# Patient Record
Sex: FEMALE | URBAN - METROPOLITAN AREA
[De-identification: names, ages, dates, MRNs, and addresses within clinical notes are randomized per-mention and may not be internally consistent; named-entity substitution may affect disease eponyms.]

---

## 2021-11-12 ENCOUNTER — APPOINTMENT (RX ONLY)
Dept: URBAN - METROPOLITAN AREA CLINIC 51 | Facility: CLINIC | Age: 41
Setting detail: DERMATOLOGY
End: 2021-11-12

## 2021-11-12 DIAGNOSIS — Z41.9 ENCOUNTER FOR PROCEDURE FOR PURPOSES OTHER THAN REMEDYING HEALTH STATE, UNSPECIFIED: ICD-10-CM

## 2021-11-12 PROCEDURE — ? LASER HAIR REMOVAL

## 2021-11-12 NOTE — PROCEDURE: LASER HAIR REMOVAL
Treatment Number: 0
Post-Care Instructions: I reviewed with the patient in detail post-care instructions. Patient should avoid sun for a minimum of 4 weeks before and after treatment.
Laser Type: Lumenis Splendor X
Fluence (Will Not Render If 0): [address]
Render Post-Care In The Note: No
Spot Size: 12 mm
Total Pulses: [address]
Cooling: chill tip
Fluence (Will Not Render If 0): 15
Fluence (Will Not Render If 0): [address] [facility]
Topical Anesthesia Type: 9.6% lidocaine
Anesthesia Type: 1% lidocaine with epinephrine
Detail Level: Detailed
Pre-Procedure: Prior to proceeding the treatment areas were cleaned and all present put on their eye protection.
Spot Size: 10 mm
Treatment Number: 1
Post-Procedure Care: Immediate endpoint: perifollicular erythema and edema. Hydrocortisone cream applied. Post care reviewed with patient.
Consent: Written consent obtained, risks reviewed including but not limited to crusting, scabbing, blistering, scarring, darker or lighter pigmentary change, paradoxical hair regrowth, incomplete removal of hair and infection.
Number Of Prepaid Treatments (Will Not Render If 0): 8
Fluence (Will Not Render If 0): [address] [facility]
Total Pulses: [address]
Spot Size: 8 mm
Eye Shield Text: Given the treatment area eye shields were inserted prior to treatment.
Cooling: DCD setting

## 2021-12-10 ENCOUNTER — APPOINTMENT (RX ONLY)
Dept: URBAN - METROPOLITAN AREA CLINIC 51 | Facility: CLINIC | Age: 41
Setting detail: DERMATOLOGY
End: 2021-12-10

## 2021-12-10 DIAGNOSIS — Z41.9 ENCOUNTER FOR PROCEDURE FOR PURPOSES OTHER THAN REMEDYING HEALTH STATE, UNSPECIFIED: ICD-10-CM

## 2021-12-10 PROCEDURE — ? LASER HAIR REMOVAL

## 2021-12-10 NOTE — PROCEDURE: LASER HAIR REMOVAL
Spot Size: 8 mm
Number Of Prepaid Treatments (Will Not Render If 0): 0
Total Pulses: [address]
Cooling: DCD setting
Total Pulses: [address]
Cooling: chill tip
Spot Size: 12 mm
Render Post-Care In The Note: No
Treatment Number: 2
Pre-Procedure: Prior to proceeding the treatment areas were cleaned and all present put on their eye protection.
Anesthesia Type: 1% lidocaine with epinephrine
Post-Care Instructions: I reviewed with the patient in detail post-care instructions. Patient should avoid sun for a minimum of 4 weeks before and after treatment.
Fluence (Will Not Render If 0): [address]
Eye Shield Text: Given the treatment area eye shields were inserted prior to treatment.
Fluence (Will Not Render If 0): [address] [facility]
Post-Procedure Care: Immediate endpoint: perifollicular erythema and edema. Hydrocortisone cream applied. Post care reviewed with patient.
Topical Anesthesia Type: 9.6% lidocaine
Number Of Prepaid Treatments (Will Not Render If 0): 8
Fluence (Will Not Render If 0): 15
Detail Level: Detailed
Fluence (Will Not Render If 0): [address] [facility]
Consent: Written consent obtained, risks reviewed including but not limited to crusting, scabbing, blistering, scarring, darker or lighter pigmentary change, paradoxical hair regrowth, incomplete removal of hair and infection.
Laser Type: Lumenis Splendor X
Spot Size: 10 mm

## 2021-12-10 NOTE — HPI: COSMETIC (LASER HAIR REMOVAL)
Have You Had Laser Hair Removal Before?: has had previous treatment
When Was Your Last Laser Treatment?: 11/12/2021
Number Of Treatments: 1

## 2022-01-11 ENCOUNTER — APPOINTMENT (RX ONLY)
Dept: URBAN - METROPOLITAN AREA CLINIC 51 | Facility: CLINIC | Age: 42
Setting detail: DERMATOLOGY
End: 2022-01-11

## 2022-01-11 DIAGNOSIS — Z41.9 ENCOUNTER FOR PROCEDURE FOR PURPOSES OTHER THAN REMEDYING HEALTH STATE, UNSPECIFIED: ICD-10-CM

## 2022-01-11 PROCEDURE — ? LASER HAIR REMOVAL

## 2022-01-11 NOTE — PROCEDURE: LASER HAIR REMOVAL
Spot Size: 10 mm
Treatment Number: 0
Number Of Prepaid Treatments (Will Not Render If 0): 8
Post-Procedure Care: Immediate endpoint: perifollicular erythema and edema. Hydrocortisone cream applied. Post care reviewed with patient.
Detail Level: Detailed
Fluence (Will Not Render If 0): [address] [facility]
Consent: Written consent obtained, risks reviewed including but not limited to crusting, scabbing, blistering, scarring, darker or lighter pigmentary change, paradoxical hair regrowth, incomplete removal of hair and infection.
Cooling: DCD setting
Topical Anesthesia Type: 9.6% lidocaine
Eye Shield Text: Given the treatment area eye shields were inserted prior to treatment.
Total Pulses: [address]
Spot Size: 8 mm
Fluence (Will Not Render If 0): [address] [place]
Laser Type: Lumenis Splendor X
Post-Care Instructions: I reviewed with the patient in detail post-care instructions. Patient should avoid sun for a minimum of 4 weeks before and after treatment.
Cooling: chill tip
Total Pulses: [address]
Render Post-Care In The Note: No
Spot Size: 12 mm
Fluence (Will Not Render If 0): 15
Anesthesia Type: 1% lidocaine with epinephrine
Treatment Number: 3
Fluence (Will Not Render If 0): [address] [facility]
Pre-Procedure: Prior to proceeding the treatment areas were cleaned and all present put on their eye protection.
Fluence (Will Not Render If 0): 26
Total Pulses: [place]
Fluence (Will Not Render If 0): 20
Total Pulses (Settings #3): 548
Treatment Number: 1

## 2022-01-11 NOTE — HPI: COSMETIC (LASER HAIR REMOVAL)
Have You Had Laser Hair Removal Before?: has had previous treatment
When Was Your Last Laser Treatment?: 12/10/2021
Number Of Treatments: 2
Have You Had Laser Hair Removal Before?: has not had previous treatment

## 2022-02-08 ENCOUNTER — APPOINTMENT (RX ONLY)
Dept: URBAN - METROPOLITAN AREA CLINIC 51 | Facility: CLINIC | Age: 42
Setting detail: DERMATOLOGY
End: 2022-02-08

## 2022-02-08 DIAGNOSIS — Z41.9 ENCOUNTER FOR PROCEDURE FOR PURPOSES OTHER THAN REMEDYING HEALTH STATE, UNSPECIFIED: ICD-10-CM

## 2022-02-08 PROCEDURE — ? LASER HAIR REMOVAL

## 2022-02-08 NOTE — HPI: COSMETIC (LASER HAIR REMOVAL)
Have You Had Laser Hair Removal Before?: has had previous treatment
When Was Your Last Laser Treatment?: 01/11/2022
Number Of Treatments: 3
Number Of Treatments: 1

## 2022-02-08 NOTE — PROCEDURE: LASER HAIR REMOVAL
Treatment Number: 0
Number Of Prepaid Treatments (Will Not Render If 0): 8
Cooling: DCD setting
Consent: Written consent obtained, risks reviewed including but not limited to crusting, scabbing, blistering, scarring, darker or lighter pigmentary change, paradoxical hair regrowth, incomplete removal of hair and infection.
Post-Procedure Care: Immediate endpoint: perifollicular erythema and edema. Hydrocortisone cream applied. Post care reviewed with patient.
Fluence (Will Not Render If 0): [address] [facility]
Spot Size: 8 mm
Were Eye Shields Employed?: No
Total Pulses: [address]
Pre-Procedure: Prior to proceeding the treatment areas were cleaned and all present put on their eye protection.
Fluence (Will Not Render If 0): [address] [facility]
Treatment Number: 4
Cooling: chill tip
Anesthesia Type: 1% lidocaine with epinephrine
Spot Size: 10 mm
Spot Size: 12 mm
Total Pulses: [address] [facility]
Topical Anesthesia Type: 9.6% lidocaine
Fluence (Will Not Render If 0): 15
Eye Shield Text: Given the treatment area eye shields were inserted prior to treatment.
Laser Type: Lumenis Splendor X
Post-Care Instructions: I reviewed with the patient in detail post-care instructions. Patient should avoid sun for a minimum of 4 weeks before and after treatment.
Fluence (Will Not Render If 0): [address] [place] [address]
Detail Level: Detailed
Fluence (Will Not Render If 0): 20
[facility]
Total Pulses (Settings #3): 548
Treatment Number: 2
Fluence (Will Not Render If 0): 26

## 2022-03-11 ENCOUNTER — APPOINTMENT (RX ONLY)
Dept: URBAN - METROPOLITAN AREA CLINIC 51 | Facility: CLINIC | Age: 42
Setting detail: DERMATOLOGY
End: 2022-03-11

## 2022-03-11 DIAGNOSIS — Z41.9 ENCOUNTER FOR PROCEDURE FOR PURPOSES OTHER THAN REMEDYING HEALTH STATE, UNSPECIFIED: ICD-10-CM

## 2022-03-11 PROCEDURE — ? LASER HAIR REMOVAL

## 2022-03-11 NOTE — HPI: COSMETIC (LASER HAIR REMOVAL)
Have You Had Laser Hair Removal Before?: has had previous treatment
When Was Your Last Laser Treatment?: 02/08/2022
Number Of Treatments: 4
Number Of Treatments: 2

## 2022-03-11 NOTE — PROCEDURE: LASER HAIR REMOVAL
Treatment Number: 0
Fluence (Will Not Render If 0): [address]
Post-Care Instructions: I reviewed with the patient in detail post-care instructions. Patient should avoid sun for a minimum of 4 weeks before and after treatment.
Laser Type: Lumenis Splendor X
Render Post-Care In The Note: No
Spot Size: 12 mm
Eye Shield Text: Given the treatment area eye shields were inserted prior to treatment.
Topical Anesthesia Type: 9.6% lidocaine
Fluence (Will Not Render If 0): 15
Cooling: chill tip
Total Pulses: [address] [facility]
Treatment Number: 5
Spot Size: 10 mm
Fluence (Will Not Render If 0): [address] [facility]
Anesthesia Type: 1% lidocaine with epinephrine
Pre-Procedure: Prior to proceeding the treatment areas were cleaned and all present put on their eye protection.
Cooling: DCD setting
Consent: Written consent obtained, risks reviewed including but not limited to crusting, scabbing, blistering, scarring, darker or lighter pigmentary change, paradoxical hair regrowth, incomplete removal of hair and infection.
Post-Procedure Care: Immediate endpoint: perifollicular erythema and edema. Hydrocortisone cream applied. Post care reviewed with patient.
Detail Level: Detailed
Spot Size: 8 mm
Total Pulses: [address]
Fluence (Will Not Render If 0): [address] [facility]
Number Of Prepaid Treatments (Will Not Render If 0): 8
Total Pulses: [address] [facility]
Fluence (Will Not Render If 0): 20
Fluence (Will Not Render If 0): 26
Total Pulses (Settings #3): 548
Treatment Number: 3

## 2022-04-18 ENCOUNTER — APPOINTMENT (RX ONLY)
Dept: URBAN - METROPOLITAN AREA CLINIC 51 | Facility: CLINIC | Age: 42
Setting detail: DERMATOLOGY
End: 2022-04-18

## 2022-04-18 DIAGNOSIS — Z41.9 ENCOUNTER FOR PROCEDURE FOR PURPOSES OTHER THAN REMEDYING HEALTH STATE, UNSPECIFIED: ICD-10-CM

## 2022-04-18 PROCEDURE — ? LASER HAIR REMOVAL

## 2022-04-18 NOTE — PROCEDURE: LASER HAIR REMOVAL
After obtaining consent, and per orders of LEVI High, injection of Vit B12 given in Right deltoid by Veronica Mcgovern. Jacob Galo, [address] [facility]. Patient instructed to remain in clinic for 20 minutes afterwards, and to report any adverse reaction to me immediately.
Spot Size: 10 mm
Number Of Prepaid Treatments (Will Not Render If 0): 8
Treatment Number: 0
Post-Procedure Care: Immediate endpoint: perifollicular erythema and edema. Hydrocortisone cream applied. Post care reviewed with patient.
Fluence (Will Not Render If 0): [address] [facility]
Cooling: DCD setting
Consent: Written consent obtained, risks reviewed including but not limited to crusting, scabbing, blistering, scarring, darker or lighter pigmentary change, paradoxical hair regrowth, incomplete removal of hair and infection.
Total Pulses: [address]
Spot Size: 8 mm
Were Eye Shields Employed?: No
Laser Type: Lumenis Splendor X
Fluence (Will Not Render If 0): [address]
Post-Care Instructions: I reviewed with the patient in detail post-care instructions. Patient should avoid sun for a minimum of 4 weeks before and after treatment.
Cooling: chill tip
Detail Level: Detailed
Total Pulses: [address]
Spot Size: 12 mm
Topical Anesthesia Type: 9.6% lidocaine
Eye Shield Text: Given the treatment area eye shields were inserted prior to treatment.
Fluence (Will Not Render If 0): 15
Fluence (Will Not Render If 0): [address] [facility]
Anesthesia Type: 1% lidocaine with epinephrine
Pre-Procedure: Prior to proceeding the treatment areas were cleaned and all present put on their eye protection.
Treatment Number: 6
Total Pulses (Settings #3): 548
Treatment Number: 4
Fluence (Will Not Render If 0): 26
Spot Size: 22 mm
Total Pulses: [address] [facility]
Fluence (Will Not Render If 0): 20

## 2022-04-18 NOTE — HPI: COSMETIC (LASER HAIR REMOVAL)
Have You Had Laser Hair Removal Before?: has had previous treatment
When Was Your Last Laser Treatment?: 03/11/2022
Number Of Treatments: 5
Number Of Treatments: 3

## 2023-09-25 ENCOUNTER — APPOINTMENT (RX ONLY)
Dept: URBAN - METROPOLITAN AREA CLINIC 51 | Facility: CLINIC | Age: 43
Setting detail: DERMATOLOGY
End: 2023-09-25

## 2023-09-25 DIAGNOSIS — Z41.9 ENCOUNTER FOR PROCEDURE FOR PURPOSES OTHER THAN REMEDYING HEALTH STATE, UNSPECIFIED: ICD-10-CM

## 2023-09-25 PROCEDURE — ? LASER HAIR REMOVAL

## 2023-09-25 NOTE — PROCEDURE: LASER HAIR REMOVAL
Anesthesia Type: 1% lidocaine with epinephrine
Fluence (Will Not Render If 0): 15
Render Post-Care In The Note: No
Cooling: chill tip
Spot Size: 12 mm
Total Pulses: 345
Detail Level: Detailed
Number Of Prepaid Treatments (Will Not Render If 0): 0
Fluence (Will Not Render If 0): 20
Topical Anesthesia Type: 9.6% lidocaine
Spot Size: 10 mm
Post-Procedure Care: Immediate endpoint: perifollicular erythema and edema. Hydrocortisone cream applied. Post care reviewed with patient.
Number Of Prepaid Treatments (Will Not Render If 0): 8
Pre-Procedure: Prior to proceeding the treatment areas were cleaned and all present put on their eye protection.
Treatment Number: 7
Eye Shield Text: Given the treatment area eye shields were inserted prior to treatment.
Cooling: DCD setting
Total Pulses (Settings #3): 548
Consent: Written consent obtained, risks reviewed including but not limited to crusting, scabbing, blistering, scarring, darker or lighter pigmentary change, paradoxical hair regrowth, incomplete removal of hair and infection.
Fluence (Will Not Render If 0): 19
Fluence (Will Not Render If 0): 26
Post-Care Instructions: I reviewed with the patient in detail post-care instructions. Patient should avoid sun for a minimum of 4 weeks before and after treatment.
Laser Type: Lumenis Splendor X
Spot Size: 8 mm

## 2023-09-25 NOTE — HPI: COSMETIC (LASER HAIR REMOVAL)
Have You Had Laser Hair Removal Before?: has had previous treatment
When Was Your Last Laser Treatment?: 04/18/2023
Number Of Treatments: 6

## 2025-01-27 ENCOUNTER — APPOINTMENT (OUTPATIENT)
Dept: URBAN - METROPOLITAN AREA CLINIC 51 | Facility: CLINIC | Age: 45
Setting detail: DERMATOLOGY
End: 2025-01-27

## 2025-01-27 DIAGNOSIS — Z41.9 ENCOUNTER FOR PROCEDURE FOR PURPOSES OTHER THAN REMEDYING HEALTH STATE, UNSPECIFIED: ICD-10-CM

## 2025-01-27 PROCEDURE — ? LASER HAIR REMOVAL

## 2025-01-27 NOTE — HPI: COSMETIC (LASER HAIR REMOVAL)
Have You Had Laser Hair Removal Before?: has had previous treatment
When Was Your Last Laser Treatment?: 09/25/2023
Number Of Treatments: 7

## 2025-01-27 NOTE — PROCEDURE: LASER HAIR REMOVAL
Treatment Number: 8
Detail Level: Detailed
Consent: Written consent obtained, risks reviewed including but not limited to crusting, scabbing, blistering, scarring, darker or lighter pigmentary change, paradoxical hair regrowth, incomplete removal of hair and infection.
Spot Size: 8 mm
Total Pulses: 448
External Cooling Fan Speed: 0
Were Eye Shields Employed?: No
Total Pulses (Settings #3): 548
Fluence (Will Not Render If 0): 26
Post-Procedure Care: Immediate endpoint: perifollicular erythema and edema. Hydrocortisone cream applied. Post care reviewed with patient.
Cooling: chill tip
Post-Care Instructions: I reviewed with the patient in detail post-care instructions. Patient should avoid sun for a minimum of 4 weeks before and after treatment.
Laser Type: Lumenis Splendor X
Fluence (Will Not Render If 0): 15
Spot Size: 10 mm
Eye Shield Text: Given the treatment area eye shields were inserted prior to treatment.
Fluence (Will Not Render If 0): 19
Anesthesia Type: 1% lidocaine with epinephrine
Topical Anesthesia Type: 9.6% lidocaine
Fluence (Will Not Render If 0): 20
Spot Size: 12 mm
Pre-Procedure: Prior to proceeding the treatment areas were cleaned and all present put on their eye protection.
Cooling: DCD setting